# Patient Record
Sex: MALE | Race: WHITE | NOT HISPANIC OR LATINO | Employment: OTHER | URBAN - NONMETROPOLITAN AREA
[De-identification: names, ages, dates, MRNs, and addresses within clinical notes are randomized per-mention and may not be internally consistent; named-entity substitution may affect disease eponyms.]

---

## 2017-01-06 ENCOUNTER — OFFICE VISIT (OUTPATIENT)
Dept: ORTHOPEDIC SURGERY | Facility: CLINIC | Age: 67
End: 2017-01-06

## 2017-01-06 VITALS — HEIGHT: 68 IN | BODY MASS INDEX: 25.46 KG/M2 | WEIGHT: 168 LBS

## 2017-01-06 DIAGNOSIS — M54.5 LOW BACK PAIN, UNSPECIFIED BACK PAIN LATERALITY, UNSPECIFIED CHRONICITY, WITH SCIATICA PRESENCE UNSPECIFIED: ICD-10-CM

## 2017-01-06 DIAGNOSIS — C79.51 METASTASIS TO SPINAL COLUMN (HCC): Primary | ICD-10-CM

## 2017-01-06 DIAGNOSIS — C61 PROSTATE CANCER METASTATIC TO MULTIPLE SITES (HCC): ICD-10-CM

## 2017-01-06 PROCEDURE — 96372 THER/PROPH/DIAG INJ SC/IM: CPT | Performed by: ORTHOPAEDIC SURGERY

## 2017-01-06 PROCEDURE — 99213 OFFICE O/P EST LOW 20 MIN: CPT | Performed by: ORTHOPAEDIC SURGERY

## 2017-01-06 RX ORDER — BENAZEPRIL/HYDROCHLOROTHIAZIDE 20 MG-25MG
TABLET ORAL
COMMUNITY
Start: 2017-01-03 | End: 2018-01-02 | Stop reason: CLARIF

## 2017-01-06 RX ORDER — IBUPROFEN 800 MG/1
TABLET ORAL
COMMUNITY
Start: 2016-11-22 | End: 2018-01-02 | Stop reason: ALTCHOICE

## 2017-01-06 RX ORDER — MEPERIDINE HYDROCHLORIDE 50 MG/1
TABLET ORAL
Refills: 0 | COMMUNITY
Start: 2016-11-07 | End: 2018-01-02 | Stop reason: CLARIF

## 2017-01-06 RX ORDER — KETOROLAC TROMETHAMINE 30 MG/ML
60 INJECTION, SOLUTION INTRAMUSCULAR; INTRAVENOUS ONCE
Status: COMPLETED | OUTPATIENT
Start: 2017-01-06 | End: 2017-01-06

## 2017-01-06 RX ORDER — HYDROCODONE BITARTRATE AND ACETAMINOPHEN 10; 325 MG/1; MG/1
TABLET ORAL
COMMUNITY
Start: 2016-12-21 | End: 2019-01-01 | Stop reason: SDUPTHER

## 2017-01-06 RX ADMIN — KETOROLAC TROMETHAMINE 60 MG: 30 INJECTION, SOLUTION INTRAMUSCULAR; INTRAVENOUS at 16:48

## 2017-01-06 NOTE — MR AVS SNAPSHOT
Houston Urias   1/6/2017 3:40 PM   Office Visit    Dept Phone:  [phone]   Encounter #:  [id number]    Provider:  Nick Mooney MD   Department:  [facility] ORTHOPEDICS                Your Full Care Plan              Today's Medication Changes          These changes are accurate as of: 1/6/17  4:47 PM.  If you have any questions, ask your nurse or doctor.               Medication(s)that have changed:     benazepril-hydrochlorthiazide 20-25 MG per tablet   Commonly known as:  LOTENSIN HCT   What changed:  Another medication with the same name was removed. Continue taking this medication, and follow the directions you see here.       HYDROcodone-acetaminophen  MG per tablet   Commonly known as:  NORCO   What changed:  Another medication with the same name was removed. Continue taking this medication, and follow the directions you see here.                  Your Updated Medication List          This list is accurate as of: 1/6/17  4:47 PM.  Always use your most recent med list.                benazepril-hydrochlorthiazide 20-25 MG per tablet   Commonly known as:  LOTENSIN HCT       finasteride 5 MG tablet   Commonly known as:  PROSCAR       HYDROcodone-acetaminophen  MG per tablet   Commonly known as:  NORCO       ibuprofen 800 MG tablet   Commonly known as:  ADVIL,MOTRIN       meperidine 50 MG tablet   Commonly known as:  DEMEROL       metFORMIN 1000 MG tablet   Commonly known as:  GLUCOPHAGE       MethylPREDNISolone 4 MG tablet   Commonly known as:  MEDROL (LIO)       omeprazole 20 MG capsule   Commonly known as:  priLOSEC       predniSONE 20 MG tablet   Commonly known as:  DELTASONE               You Were Diagnosed With        Codes Comments    Metastasis to spinal column    -  Primary ICD-10-CM: C79.51  ICD-9-CM: 198.5     Prostate cancer metastatic to multiple sites     ICD-10-CM: C61  ICD-9-CM: 185, 199.0     Low back pain, unspecified  "back pain laterality, unspecified chronicity, with sciatica presence unspecified     ICD-10-CM: M54.5  ICD-9-CM: 724.2       Instructions     None    Patient Instructions History      Upcoming Appointments     Visit Type Date Time Department    FOLLOW UP 1/6/2017  3:40 PM [facility]      MyChart Signup     Our records indicate that you have declined [employer] signup. If you would like to sign up for [employer], please email Dr Sears [email or web address] or call [phone] to obtain an activation code.             Other Info from Your Visit           Allergies     No Known Allergies      Reason for Visit     Thoracic Spine - Follow-up     Lumbar Spine - Follow-up     Results MRI 12/16/16 @  thoracic and lumbar.       Vital Signs     Height Weight Body Mass Index Smoking Status          68\" (172.7 cm) 168 lb (76.2 kg) 25.54 kg/m2 Never Smoker        Problems and Diagnoses Noted     Benign enlargement of prostate    High blood pressure    Gout    History of malignant neoplasm of prostate    Low back pain    Metastasis to spinal column    Multiple-type hyperlipidemia    Degenerative arthritis of hand    Prostate cancer metastatic to multiple sites    Type 2 diabetes        "

## 2017-01-06 NOTE — LETTER
"January 16, 2017     Nilson Houston MD  [address]  [facility] [address]    Patient: Houston Urias   YOB: 1950   Date of Visit: 1/6/2017       Dear Dr. Rosemarie MD:    Houston Urias was in my office today. Below is a copy of my note.    If you have questions, please do not hesitate to call me. I look forward to following Houston along with you.         Sincerely,        Nick Mooney MD        CC: No Recipients    Houston Urias is a 66 y.o. male returns for     Chief Complaint   Patient presents with   • Thoracic Spine - Follow-up   • Lumbar Spine - Follow-up   • Results     MRI 12/16/16 @  thoracic and lumbar.        HISTORY OF PRESENT ILLNESS:    Comes today to review the MRI of the thoracic spine and lumbar spines.  He has back pain, without any significant changes.       CONCURRENT MEDICAL HISTORY:    No past medical history on file.    No Known Allergies      Current Outpatient Prescriptions:   •  benazepril-hydrochlorthiazide (LOTENSIN HCT) 20-25 MG per tablet, , Disp: , Rfl:   •  finasteride (PROSCAR) 5 MG tablet, , Disp: , Rfl:   •  HYDROcodone-acetaminophen (NORCO)  MG per tablet, , Disp: , Rfl:   •  ibuprofen (ADVIL,MOTRIN) 800 MG tablet, , Disp: , Rfl:   •  meperidine (DEMEROL) 50 MG tablet, TK 1 T PO Q 8 H PRN, Disp: , Rfl: 0  •  metFORMIN (GLUCOPHAGE) 1000 MG tablet, , Disp: , Rfl:   •  MethylPREDNISolone (MEDROL, LIO,) 4 MG tablet, TK UTD, Disp: , Rfl: 1  •  omeprazole (priLOSEC) 20 MG capsule, Take 20 mg by mouth Daily., Disp: , Rfl:   •  predniSONE (DELTASONE) 20 MG tablet, , Disp: , Rfl: 0    No past surgical history on file.    ROS  No fevers or chills.  No chest pain or shortness of air.  No GI or  disturbances.    PHYSICAL EXAMINATION:       Visit Vitals   • Ht 68\" (172.7 cm)   • Wt 168 lb (76.2 kg)   • BMI 25.54 kg/m2       Physical Exam   Constitutional: He appears well-developed.   HENT:   Head: Normocephalic and atraumatic. "   Eyes: Pupils are equal, round, and reactive to light. Right eye exhibits no discharge. Left eye exhibits no discharge.   Neck: Normal range of motion. No JVD present. No tracheal deviation present. No thyromegaly present.   Cardiovascular: Normal rate, regular rhythm, normal heart sounds and intact distal pulses.  Exam reveals no gallop and no friction rub.    No murmur heard.  Pulmonary/Chest: Effort normal and breath sounds normal. No respiratory distress. He has no wheezes. He has no rales. He exhibits no tenderness.   Abdominal: Soft. Bowel sounds are normal. He exhibits no distension. There is no tenderness. There is no guarding.   Musculoskeletal: He exhibits no edema or deformity.        Lumbar back: He exhibits decreased range of motion and tenderness. He exhibits no bony tenderness, no swelling, no edema, no deformity, no laceration, no pain and no spasm.   Lymphadenopathy:     He has no cervical adenopathy.   Neurological: He is alert. He has normal reflexes. He displays normal reflexes. No cranial nerve deficit. Coordination normal.   Skin: Skin is warm. No rash noted. No erythema.   Psychiatric: He has a normal mood and affect. His behavior is normal. Thought content normal.       GAIT:     []  Normal  []  Antalgic    Assistive device: []  None  []  Walker     []  Crutches  []  Cane     []  Wheelchair  []  Stretcher    Right Ankle Exam     Muscle Strength   Dorsiflexion:  3/5  Plantar flexion:  5/5  Anterior tibial:  3/5  Posterior tibial:  5/5  Gastrocsoleus:  5/5  Peroneal muscle:  5/5      Left Ankle Exam     Muscle Strength   Dorsiflexion:  5/5   Plantar flexion:  5/5   Anterior tibial:  5/5   Posterior tibial:  5/5  Gastrocsoleus:  5/5  Peroneal muscle:  5/5      Right Hip Exam     Range of Motion   Internal Rotation: normal   External Rotation: normal     Muscle Strength   Abduction: 5/5   Flexion: 4/5       Left Hip Exam     Range of Motion   Internal Rotation: normal   External Rotation: normal      Muscle Strength   Abduction: 5/5   Flexion: 4/5       Back Exam     Tenderness   The patient is experiencing no tenderness.     Range of Motion   Extension: 30   Flexion:  90 normal   Lateral Bend Right: 20   Lateral Bend Left: 20   Rotation Right: 30   Rotation Left: 30     Muscle Strength   Right Quadriceps:  5/5   Left Quadriceps:  5/5   Right Hamstrings:  5/5   Left Hamstrings:  5/5     Tests   Straight leg raise right: negative  Straight leg raise left: negative    Reflexes   Patellar:  0/4 abnormal  Achilles: 2/4  Biceps: 2/4  Babinski's sign: normal     Other   Sensation: normal  Gait: normal   Erythema: no back redness  Scars: absent              MRI thoracic spine with and without [id number]/16/2016  [facility]  Result Impression       There is bony metastasis to C7 and most of the thoracic vertebrae with early epidural extension at multiple levels without significant spinal stenosis.   Result Narrative   Indication:  Bone metastasis from prostate.    MRI, T-spine with/without 7.5 mL Gadovist:  There is metastasis to the left posterior-inferior C7 vertebral body.  T1 and T2 are unremarkable.  The left posterior T3 vertebral body and left pedicle have tumor involvement without epidural extension.    There is extensive tumor involvement of the T4 vertebral body, right pedicle, left pedicle, and adjacent lamina.  There is early right ventral epidural extension but this is not causing Significant encroachment on the canal.    The left side of the T5 vertebral body has extensive tumor involvement with early right ventral epidural extension.  This also involves the right pedicle and adjacent lamina; no significant encroachment on the spinal canal but there is epidural   involvement of the right neural foramen with uncertain impact on the right T5 nerve root.    There are several small areas of tumor involvement at T6 and there is tumor in the right pedicle.    T7 has extensive tumor involvement in the  vertebral body and in both pedicles with early ventral epidural tumor.    There is extensive tumor involvement of T8 with epidural extension that is mild and not significantly compressing the canal.    T9 has several small areas of involvement in the body and both pedicles are involved with tumor.  There is early epidural extension without significant spinal stenosis.  There is mild involvement of the left pedicle of T10.    T11 has mild involvement of the left pedicle and a larger lesion in the vertebral body.    T12 is unremarkable.    There is patchy enhancement of the lesions with gadolinium.     MRI lumbar spine with and without [id number]/16/2016  [facility]  Result Impression       1.  There is metastasis to all of the lumbar vertebrae and the sacrum.    2.  This is most extensive at the L3 level where there is epidural extension on the right with flattening of the right side of the thecal sac and involvement of the right L3 nerve root.  There may be early retroperitoneal invasion.   Result Narrative   Indication:  Prostate cancer.    MRI, L-spine with/without 7.5 mL Gadovist:  The L1 vertebral body has several small areas of tumor involvement.  There are larger areas of tumor involvement in the L2 vertebral body with extension into the left pedicle.    At the L3 level, the entire vertebra is infiltrated with tumor and there is epidural extension with early infiltration of the right L3 nerve root and flattening of the right side of the thecal sac.    The L4 level has extensive tumor deposits in the vertebral body without epidural extension.    The left anterior L5 vertebral body has tumor involvement.    The S1 vertebral body has extensive tumor.    No significant spinal stenosis is seen.  The conus has a normal position and appearance.  No retroperitoneal adenopathy is seen.  The posteromedial aspect of the psoas muscles at L3  have a subtly infiltrated appearance to the adjacent fat and early    retroperitoneal invasion may be present.               ASSESSMENT:    Diagnoses and all orders for this visit:    Metastasis to spinal column    Prostate cancer metastatic to multiple sites    Low back pain, unspecified back pain laterality, unspecified chronicity, with sciatica presence unspecified  -     ketorolac (TORADOL) injection 60 mg; Inject 60 mg into the shoulder, thigh, or buttocks 1 (One) Time.    Other orders  -     HYDROcodone-acetaminophen (NORCO)  MG per tablet;   -     ibuprofen (ADVIL,MOTRIN) 800 MG tablet;   -     meperidine (DEMEROL) 50 MG tablet; TK 1 T PO Q 8 H PRN  -     metFORMIN (GLUCOPHAGE) 1000 MG tablet;   -     benazepril-hydrochlorthiazide (LOTENSIN HCT) 20-25 MG per tablet;           PLAN    No surgery without focal neurologic deficit or vertebral body destruction.  Recommend possible radiation therapy, if he is an appropriate candidate.  I do not believe he is seeing a radiation oncologist, although I reviewed with him this an option.        Nick Mooney MD

## 2017-01-06 NOTE — PROGRESS NOTES
"Houston Urias is a 66 y.o. male returns for     Chief Complaint   Patient presents with   • Thoracic Spine - Follow-up   • Lumbar Spine - Follow-up   • Results     MRI 12/16/16 @  thoracic and lumbar.        HISTORY OF PRESENT ILLNESS:    Comes today to review the MRI of the thoracic spine and lumbar spines.  He has back pain, without any significant changes.       CONCURRENT MEDICAL HISTORY:    No past medical history on file.    No Known Allergies      Current Outpatient Prescriptions:   •  benazepril-hydrochlorthiazide (LOTENSIN HCT) 20-25 MG per tablet, , Disp: , Rfl:   •  finasteride (PROSCAR) 5 MG tablet, , Disp: , Rfl:   •  HYDROcodone-acetaminophen (NORCO)  MG per tablet, , Disp: , Rfl:   •  ibuprofen (ADVIL,MOTRIN) 800 MG tablet, , Disp: , Rfl:   •  meperidine (DEMEROL) 50 MG tablet, TK 1 T PO Q 8 H PRN, Disp: , Rfl: 0  •  metFORMIN (GLUCOPHAGE) 1000 MG tablet, , Disp: , Rfl:   •  MethylPREDNISolone (MEDROL, LIO,) 4 MG tablet, TK UTD, Disp: , Rfl: 1  •  omeprazole (priLOSEC) 20 MG capsule, Take 20 mg by mouth Daily., Disp: , Rfl:   •  predniSONE (DELTASONE) 20 MG tablet, , Disp: , Rfl: 0    No past surgical history on file.    ROS  No fevers or chills.  No chest pain or shortness of air.  No GI or  disturbances.    PHYSICAL EXAMINATION:       Visit Vitals   • Ht 68\" (172.7 cm)   • Wt 168 lb (76.2 kg)   • BMI 25.54 kg/m2       Physical Exam   Constitutional: He appears well-developed.   HENT:   Head: Normocephalic and atraumatic.   Eyes: Pupils are equal, round, and reactive to light. Right eye exhibits no discharge. Left eye exhibits no discharge.   Neck: Normal range of motion. No JVD present. No tracheal deviation present. No thyromegaly present.   Cardiovascular: Normal rate, regular rhythm, normal heart sounds and intact distal pulses.  Exam reveals no gallop and no friction rub.    No murmur heard.  Pulmonary/Chest: Effort normal and breath sounds normal. No respiratory distress. He " has no wheezes. He has no rales. He exhibits no tenderness.   Abdominal: Soft. Bowel sounds are normal. He exhibits no distension. There is no tenderness. There is no guarding.   Musculoskeletal: He exhibits no edema or deformity.        Lumbar back: He exhibits decreased range of motion and tenderness. He exhibits no bony tenderness, no swelling, no edema, no deformity, no laceration, no pain and no spasm.   Lymphadenopathy:     He has no cervical adenopathy.   Neurological: He is alert. He has normal reflexes. He displays normal reflexes. No cranial nerve deficit. Coordination normal.   Skin: Skin is warm. No rash noted. No erythema.   Psychiatric: He has a normal mood and affect. His behavior is normal. Thought content normal.       GAIT:     []  Normal  []  Antalgic    Assistive device: []  None  []  Walker     []  Crutches  []  Cane     []  Wheelchair  []  Stretcher    Right Ankle Exam     Muscle Strength   Dorsiflexion:  3/5  Plantar flexion:  5/5  Anterior tibial:  3/5  Posterior tibial:  5/5  Gastrocsoleus:  5/5  Peroneal muscle:  5/5      Left Ankle Exam     Muscle Strength   Dorsiflexion:  5/5   Plantar flexion:  5/5   Anterior tibial:  5/5   Posterior tibial:  5/5  Gastrocsoleus:  5/5  Peroneal muscle:  5/5      Right Hip Exam     Range of Motion   Internal Rotation: normal   External Rotation: normal     Muscle Strength   Abduction: 5/5   Flexion: 4/5       Left Hip Exam     Range of Motion   Internal Rotation: normal   External Rotation: normal     Muscle Strength   Abduction: 5/5   Flexion: 4/5       Back Exam     Tenderness   The patient is experiencing no tenderness.     Range of Motion   Extension: 30   Flexion:  90 normal   Lateral Bend Right: 20   Lateral Bend Left: 20   Rotation Right: 30   Rotation Left: 30     Muscle Strength   Right Quadriceps:  5/5   Left Quadriceps:  5/5   Right Hamstrings:  5/5   Left Hamstrings:  5/5     Tests   Straight leg raise right: negative  Straight leg raise left:  negative    Reflexes   Patellar:  0/4 abnormal  Achilles: 2/4  Biceps: 2/4  Babinski's sign: normal     Other   Sensation: normal  Gait: normal   Erythema: no back redness  Scars: absent              MRI thoracic spine with and without [id number]/16/2016  [facility]  Result Impression       There is bony metastasis to C7 and most of the thoracic vertebrae with early epidural extension at multiple levels without significant spinal stenosis.   Result Narrative   Indication:  Bone metastasis from prostate.    MRI, T-spine with/without 7.5 mL Gadovist:  There is metastasis to the left posterior-inferior C7 vertebral body.  T1 and T2 are unremarkable.  The left posterior T3 vertebral body and left pedicle have tumor involvement without epidural extension.    There is extensive tumor involvement of the T4 vertebral body, right pedicle, left pedicle, and adjacent lamina.  There is early right ventral epidural extension but this is not causing Significant encroachment on the canal.    The left side of the T5 vertebral body has extensive tumor involvement with early right ventral epidural extension.  This also involves the right pedicle and adjacent lamina; no significant encroachment on the spinal canal but there is epidural   involvement of the right neural foramen with uncertain impact on the right T5 nerve root.    There are several small areas of tumor involvement at T6 and there is tumor in the right pedicle.    T7 has extensive tumor involvement in the vertebral body and in both pedicles with early ventral epidural tumor.    There is extensive tumor involvement of T8 with epidural extension that is mild and not significantly compressing the canal.    T9 has several small areas of involvement in the body and both pedicles are involved with tumor.  There is early epidural extension without significant spinal stenosis.  There is mild involvement of the left pedicle of T10.    T11 has mild involvement of the left  pedicle and a larger lesion in the vertebral body.    T12 is unremarkable.    There is patchy enhancement of the lesions with gadolinium.     MRI lumbar spine with and without [id number]/16/2016  [facility]  Result Impression       1.  There is metastasis to all of the lumbar vertebrae and the sacrum.    2.  This is most extensive at the L3 level where there is epidural extension on the right with flattening of the right side of the thecal sac and involvement of the right L3 nerve root.  There may be early retroperitoneal invasion.   Result Narrative   Indication:  Prostate cancer.    MRI, L-spine with/without 7.5 mL Gadovist:  The L1 vertebral body has several small areas of tumor involvement.  There are larger areas of tumor involvement in the L2 vertebral body with extension into the left pedicle.    At the L3 level, the entire vertebra is infiltrated with tumor and there is epidural extension with early infiltration of the right L3 nerve root and flattening of the right side of the thecal sac.    The L4 level has extensive tumor deposits in the vertebral body without epidural extension.    The left anterior L5 vertebral body has tumor involvement.    The S1 vertebral body has extensive tumor.    No significant spinal stenosis is seen.  The conus has a normal position and appearance.  No retroperitoneal adenopathy is seen.  The posteromedial aspect of the psoas muscles at L3  have a subtly infiltrated appearance to the adjacent fat and early   retroperitoneal invasion may be present.               ASSESSMENT:    Diagnoses and all orders for this visit:    Metastasis to spinal column    Prostate cancer metastatic to multiple sites    Low back pain, unspecified back pain laterality, unspecified chronicity, with sciatica presence unspecified  -     ketorolac (TORADOL) injection 60 mg; Inject 60 mg into the shoulder, thigh, or buttocks 1 (One) Time.    Other orders  -     HYDROcodone-acetaminophen (NORCO)   MG per tablet;   -     ibuprofen (ADVIL,MOTRIN) 800 MG tablet;   -     meperidine (DEMEROL) 50 MG tablet; TK 1 T PO Q 8 H PRN  -     metFORMIN (GLUCOPHAGE) 1000 MG tablet;   -     benazepril-hydrochlorthiazide (LOTENSIN HCT) 20-25 MG per tablet;           PLAN    No surgery without focal neurologic deficit or vertebral body destruction.  Recommend possible radiation therapy, if he is an appropriate candidate.  I do not believe he is seeing a radiation oncologist, although I reviewed with him this an option.        Nick Mooney MD

## 2017-10-24 ENCOUNTER — HOSPITAL ENCOUNTER (OUTPATIENT)
Dept: MRI IMAGING | Facility: HOSPITAL | Age: 67
Discharge: HOME | End: 2017-10-24
Admitting: INTERNAL MEDICINE

## 2017-10-24 ENCOUNTER — HOSPITAL ENCOUNTER (OUTPATIENT)
Dept: MRI IMAGING | Facility: HOSPITAL | Age: 67
Discharge: HOME | End: 2017-10-24

## 2017-10-24 DIAGNOSIS — C61 MALIGNANT NEOPLASM OF PROSTATE (HCC): ICD-10-CM

## 2017-10-24 PROCEDURE — 72158 MRI LUMBAR SPINE W/O & W/DYE: CPT

## 2017-10-24 PROCEDURE — 25010000002 GADOTERIDOL PER 1 ML: Performed by: INTERNAL MEDICINE

## 2017-10-24 PROCEDURE — 72156 MRI NECK SPINE W/O & W/DYE: CPT

## 2017-10-24 PROCEDURE — A9576 INJ PROHANCE MULTIPACK: HCPCS | Performed by: INTERNAL MEDICINE

## 2017-10-24 PROCEDURE — 72157 MRI CHEST SPINE W/O & W/DYE: CPT

## 2017-10-24 RX ADMIN — GADOTERIDOL 18 ML: 279.3 INJECTION, SOLUTION INTRAVENOUS at 14:58

## 2017-10-25 DIAGNOSIS — C61 MALIGNANT NEOPLASM OF PROSTATE (HCC): Primary | ICD-10-CM

## 2017-11-01 ENCOUNTER — HOSPITAL ENCOUNTER (OUTPATIENT)
Dept: NUCLEAR MEDICINE | Facility: HOSPITAL | Age: 67
Discharge: HOME | End: 2017-11-01

## 2017-11-01 DIAGNOSIS — C61 MALIGNANT NEOPLASM OF PROSTATE (HCC): ICD-10-CM

## 2017-11-01 PROCEDURE — 0 TECHNETIUM MEDRONATE KIT: Performed by: UROLOGY

## 2017-11-01 PROCEDURE — A9503 TC99M MEDRONATE: HCPCS | Performed by: UROLOGY

## 2017-11-01 PROCEDURE — 78306 BONE IMAGING WHOLE BODY: CPT

## 2017-11-01 RX ORDER — TC 99M MEDRONATE 20 MG/10ML
26.8 INJECTION, POWDER, LYOPHILIZED, FOR SOLUTION INTRAVENOUS
Status: COMPLETED | OUTPATIENT
Start: 2017-11-01 | End: 2017-11-01

## 2017-11-01 RX ADMIN — Medication 26.8 MILLICURIE: at 09:19

## 2018-01-02 RX ORDER — TAMSULOSIN HYDROCHLORIDE 0.4 MG/1
1 CAPSULE ORAL NIGHTLY
COMMUNITY

## 2018-01-02 RX ORDER — MORPHINE SULFATE 30 MG/1
30 TABLET ORAL
COMMUNITY

## 2018-01-09 ENCOUNTER — ANESTHESIA EVENT (OUTPATIENT)
Dept: GASTROENTEROLOGY | Facility: HOSPITAL | Age: 68
End: 2018-01-09

## 2018-01-09 ENCOUNTER — ANESTHESIA (OUTPATIENT)
Dept: GASTROENTEROLOGY | Facility: HOSPITAL | Age: 68
End: 2018-01-09

## 2018-01-09 ENCOUNTER — HOSPITAL ENCOUNTER (OUTPATIENT)
Facility: HOSPITAL | Age: 68
Setting detail: HOSPITAL OUTPATIENT SURGERY
Discharge: HOME | End: 2018-01-09
Attending: INTERNAL MEDICINE | Admitting: INTERNAL MEDICINE

## 2018-01-09 VITALS
SYSTOLIC BLOOD PRESSURE: 108 MMHG | HEIGHT: 68 IN | WEIGHT: 151.9 LBS | DIASTOLIC BLOOD PRESSURE: 46 MMHG | BODY MASS INDEX: 23.02 KG/M2 | RESPIRATION RATE: 14 BRPM | OXYGEN SATURATION: 96 % | TEMPERATURE: 97.7 F | HEART RATE: 93 BPM

## 2018-01-09 DIAGNOSIS — Z12.11 ENCOUNTER FOR SCREENING COLONOSCOPY: ICD-10-CM

## 2018-01-09 LAB — GLUCOSE BLDC GLUCOMTR-MCNC: 129 MG/DL (ref 70–130)

## 2018-01-09 PROCEDURE — 88305 TISSUE EXAM BY PATHOLOGIST: CPT | Performed by: PATHOLOGY

## 2018-01-09 PROCEDURE — 82962 GLUCOSE BLOOD TEST: CPT

## 2018-01-09 PROCEDURE — 25010000002 FENTANYL CITRATE (PF) 100 MCG/2ML SOLUTION: Performed by: NURSE ANESTHETIST, CERTIFIED REGISTERED

## 2018-01-09 PROCEDURE — 88305 TISSUE EXAM BY PATHOLOGIST: CPT | Performed by: INTERNAL MEDICINE

## 2018-01-09 PROCEDURE — 25010000002 PROPOFOL 10 MG/ML EMULSION: Performed by: NURSE ANESTHETIST, CERTIFIED REGISTERED

## 2018-01-09 RX ORDER — FENTANYL CITRATE 50 UG/ML
INJECTION, SOLUTION INTRAMUSCULAR; INTRAVENOUS AS NEEDED
Status: DISCONTINUED | OUTPATIENT
Start: 2018-01-09 | End: 2018-01-09 | Stop reason: SURG

## 2018-01-09 RX ORDER — LIDOCAINE HYDROCHLORIDE 10 MG/ML
INJECTION, SOLUTION INFILTRATION; PERINEURAL AS NEEDED
Status: DISCONTINUED | OUTPATIENT
Start: 2018-01-09 | End: 2018-01-09 | Stop reason: SURG

## 2018-01-09 RX ORDER — DEXTROSE AND SODIUM CHLORIDE 5; .45 G/100ML; G/100ML
20 INJECTION, SOLUTION INTRAVENOUS CONTINUOUS
Status: DISCONTINUED | OUTPATIENT
Start: 2018-01-09 | End: 2018-01-09 | Stop reason: HOSPADM

## 2018-01-09 RX ORDER — PROPOFOL 10 MG/ML
VIAL (ML) INTRAVENOUS AS NEEDED
Status: DISCONTINUED | OUTPATIENT
Start: 2018-01-09 | End: 2018-01-09 | Stop reason: SURG

## 2018-01-09 RX ADMIN — DEXTROSE AND SODIUM CHLORIDE 20 ML/HR: 5; 450 INJECTION, SOLUTION INTRAVENOUS at 09:01

## 2018-01-09 RX ADMIN — PROPOFOL 30 MG: 10 INJECTION, EMULSION INTRAVENOUS at 09:45

## 2018-01-09 RX ADMIN — PROPOFOL 20 MG: 10 INJECTION, EMULSION INTRAVENOUS at 09:52

## 2018-01-09 RX ADMIN — LIDOCAINE HYDROCHLORIDE 80 MG: 10 INJECTION, SOLUTION INFILTRATION; PERINEURAL at 09:41

## 2018-01-09 RX ADMIN — PROPOFOL 80 MG: 10 INJECTION, EMULSION INTRAVENOUS at 09:41

## 2018-01-09 RX ADMIN — PROPOFOL 30 MG: 10 INJECTION, EMULSION INTRAVENOUS at 09:49

## 2018-01-09 RX ADMIN — FENTANYL CITRATE 50 MCG: 50 INJECTION, SOLUTION INTRAMUSCULAR; INTRAVENOUS at 09:41

## 2018-01-09 NOTE — ANESTHESIA POSTPROCEDURE EVALUATION
Patient: Houston Urias    Procedure Summary     Date Anesthesia Start Anesthesia Stop Room / Location    01/09/18 0941 [id number] [facility] ENDOSCOPY 2 / [facility] ENDOSCOPY       Procedure Diagnosis Surgeon Provider    COLONOSCOPY (N/A ) Encounter for screening colonoscopy  (Encounter for screening colonoscopy [Z12.11]) DO Earnest Membreno CRNA          Anesthesia Type: MAC  Last vitals  BP   114/57 (01/09/18 0844)   Temp   97.7 °F (36.5 °C) (01/09/18 0844)   Pulse   108 (01/09/18 0844)   Resp   16 (01/09/18 0844)     SpO2   98 % (01/09/18 0844)     Post Anesthesia Care and Evaluation    Patient location during evaluation: bedside  Patient participation: waiting for patient participation  Level of consciousness: responsive to verbal stimuli  Pain management: adequate  Airway patency: patent  Anesthetic complications: No anesthetic complications  PONV Status: none  Cardiovascular status: acceptable  Respiratory status: acceptable  Hydration status: acceptable

## 2018-01-09 NOTE — PLAN OF CARE
Problem: Patient Care Overview (Adult)  Goal: Plan of Care Review   01/09/18 1011   Coping/Psychosocial Response Interventions   Plan Of Care Reviewed With patient   Patient Care Overview   Progress no change   Outcome Evaluation   Outcome Summary/Follow up Plan vss       Problem: GI Endoscopy (Adult)  Goal: Signs and Symptoms of Listed Potential Problems Will be Absent or Manageable (GI Endoscopy)   01/09/18 1011   GI Endoscopy   Problems Assessed (GI Endoscopy) all   Problems Present (GI Endoscopy) none

## 2018-01-09 NOTE — PLAN OF CARE
Problem: Patient Care Overview (Adult)  Goal: Plan of Care Review   01/09/18 0958   Coping/Psychosocial Response Interventions   Plan Of Care Reviewed With patient   Patient Care Overview   Progress no change   Outcome Evaluation   Outcome Summary/Follow up Plan vss       Problem: GI Endoscopy (Adult)  Goal: Signs and Symptoms of Listed Potential Problems Will be Absent or Manageable (GI Endoscopy)   01/09/18 0958   GI Endoscopy   Problems Assessed (GI Endoscopy) all   Problems Present (GI Endoscopy) none

## 2018-01-09 NOTE — H&P
Giovany Villanueva DO,[facility]  Gastroenterology  Hepatology  Endoscopy  Board Certified in Internal Medicine and gastroenterology  [address], [address]  [place]. [address]  - [phone]   F [phone]     GASTROENTEROLOGY HISTORY AND PHYSICAL  NOTE   GIOVANY VILLANUEVA DO.         SUBJECTIVE:   1/9/2018    Name: Houston Urias  DOD: 1950        Chief Complaint:     Subjective : Screening examination for routine risk for colon cancer     Patient is 67 y.o. male presents with desire for colonoscopy.  History of radiation to the back and prostate for prostate cancer.      ROS/HISTORY/ CURRENT MEDICATIONS/OBJECTIVE/VS/PE:   Review of Systems:   Review of Systems    History:     Past Medical History:   Diagnosis Date   • Diabetes mellitus    • Hypertension    • Prostate CA 2011   • Prostate cancer metastatic to bone 2016     Past Surgical History:   Procedure Laterality Date   • LEG SURGERY Right 1973   • PROSTATE CRYOTHERAPY  2011     History reviewed. No pertinent family history.  Social History   Substance Use Topics   • Smoking status: Never Smoker   • Smokeless tobacco: Never Used   • Alcohol use No     Prescriptions Prior to Admission   Medication Sig Dispense Refill Last Dose   • benazepril 20 MG tablet 20 mg, hydrochlorothiazide 25 MG tablet 12.5 mg Take 1 tablet by mouth Daily.   1/7/2018 at Unknown time   • HYDROcodone-acetaminophen (NORCO)  MG per tablet    1/8/2018 at Unknown time   • Morphine (MSIR) 30 MG tablet Take 30 mg by mouth 2 (Two) Times a Day.   1/9/2018 at Unknown time   • omeprazole (priLOSEC) 20 MG capsule Take 20 mg by mouth Daily.   1/7/2018   • SAXagliptin (ONGLYZA) 2.5 MG tablet Take 2.5 mg by mouth Daily.   1/7/2018   • tamsulosin (FLOMAX) 0.4 MG capsule 24 hr capsule Take 1 capsule by mouth Every Night.   1/7/2018     Allergies:  Review of patient's allergies indicates no known allergies.    I have reviewed the patients medical history, surgical history  and family history in the available medical record system.     Current Medications:     Current Facility-Administered Medications   Medication Dose Route Frequency Provider Last Rate Last Dose   • dextrose 5 % and sodium chloride 0.45 % infusion  20 mL/hr Intravenous Continuous Francisco Angela DO 20 mL/hr at 01/09/18 0901 20 mL/hr at 01/09/18 0901       Objective     Physical Exam:   Temp:  [97.7 °F (36.5 °C)] 97.7 °F (36.5 °C)  Heart Rate:  [108] 108  Resp:  [16] 16  BP: (114)/(57) 114/57    Physical Exam:  General Appearance:    Alert, cooperative, in no acute distress   Head:    Normocephalic, without obvious abnormality, atraumatic   Eyes:            Lids and lashes normal, conjunctivae and sclerae normal, no   icterus, no pallor, corneas clear, PERRLA   Ears:    Ears appear intact with no abnormalities noted   Throat:   No oral lesions, no thrush, oral mucosa moist   Neck:   No adenopathy, supple, trachea midline, no thyromegaly, no     carotid bruit, no JVD   Back:     No kyphosis present, no scoliosis present, no skin lesions,       erythema or scars, no tenderness to percussion or                   palpation,   range of motion normal   Lungs:     Clear to auscultation,respirations regular, even and                   unlabored    Heart:    Regular rhythm and normal rate, normal S1 and S2, no            murmur, no gallop, no rub, no click   Breast Exam:    Deferred   Abdomen:     Normal bowel sounds, no masses, no organomegaly, soft        non-tender, non-distended, no guarding, no rebound                 tenderness   Genitalia:    Deferred   Extremities:   Moves all extremities well, no edema, no cyanosis, no              redness   Pulses:   Pulses palpable and equal bilaterally   Skin:   No bleeding, bruising or rash   Lymph nodes:   No palpable adenopathy   Neurologic:   Cranial nerves 2 - 12 grossly intact, sensation intact, DTR        present and equal bilaterally      Results Review:     No results  found for: WBC, HGB, HCT, PLT          No results found for: LIPASE  No results found for: INR       Radiology Review:  Imaging Results (last 72 hours)     ** No results found for the last 72 hours. **           I reviewed the patient's new clinical results.  I reviewed the patient's new imaging results and agree with the interpretation.     ASSESSMENT/PLAN:   ASSESSMENT:   1.  Screening examination for routine risk for colon cancer    PLAN:   1.  Colonoscopy    Risk and is benefits associated with the procedure are reviewed with the patient.  The patient wishes to proceed      Francisco Angela DO  01/09/18  9:30 AM

## 2018-01-09 NOTE — ANESTHESIA PREPROCEDURE EVALUATION
Anesthesia Evaluation     NPO Solid Status: > 8 hours  NPO Liquid Status: > 8 hours     Airway   Mallampati: III  small opening  Dental - normal exam     Pulmonary - normal exam   Cardiovascular - normal exam    (+) hypertension, hyperlipidemia      Neuro/Psych  GI/Hepatic/Renal/Endo    (+)  diabetes mellitus,     Musculoskeletal     (+) back pain,   Abdominal    Substance History      OB/GYN          Other   (+) arthritis   history of cancer                                            Anesthesia Plan    ASA 3     MAC     intravenous induction   Anesthetic plan and risks discussed with patient.

## 2018-01-10 LAB
LAB AP CASE REPORT: NORMAL
Lab: NORMAL
PATH REPORT.FINAL DX SPEC: NORMAL
PATH REPORT.GROSS SPEC: NORMAL

## 2018-01-25 ENCOUNTER — HOSPITAL ENCOUNTER (OUTPATIENT)
Dept: NUCLEAR MEDICINE | Facility: HOSPITAL | Age: 68
Discharge: HOME | End: 2018-01-25

## 2018-01-25 DIAGNOSIS — C61 MALIGNANT NEOPLASM OF PROSTATE (HCC): ICD-10-CM

## 2018-01-25 PROCEDURE — 0 TECHNETIUM MEDRONATE KIT: Performed by: INTERNAL MEDICINE

## 2018-01-25 PROCEDURE — A9503 TC99M MEDRONATE: HCPCS | Performed by: INTERNAL MEDICINE

## 2018-01-25 PROCEDURE — 78306 BONE IMAGING WHOLE BODY: CPT

## 2018-01-25 RX ORDER — TC 99M MEDRONATE 20 MG/10ML
26.7 INJECTION, POWDER, LYOPHILIZED, FOR SOLUTION INTRAVENOUS
Status: COMPLETED | OUTPATIENT
Start: 2018-01-25 | End: 2018-01-25

## 2018-01-25 RX ADMIN — Medication 26.7 MILLICURIE: at 08:59

## 2019-01-01 DIAGNOSIS — Z51.5 HOSPICE CARE: ICD-10-CM

## 2019-01-01 DIAGNOSIS — Z51.5 HOSPICE CARE: Primary | ICD-10-CM

## 2019-01-01 RX ORDER — MORPHINE SULFATE 60 MG/1
180 TABLET, FILM COATED, EXTENDED RELEASE ORAL EVERY 8 HOURS
Qty: 270 TABLET | Refills: 0 | Status: SHIPPED | OUTPATIENT
Start: 2019-01-01

## 2019-01-01 RX ORDER — MORPHINE SULFATE 20 MG/ML
5 SOLUTION ORAL
Qty: 30 ML | Refills: 0 | Status: SHIPPED | OUTPATIENT
Start: 2019-01-01 | End: 2019-01-01 | Stop reason: SDUPTHER

## 2019-01-01 RX ORDER — MORPHINE SULFATE 60 MG/1
180 TABLET, FILM COATED, EXTENDED RELEASE ORAL EVERY 8 HOURS
Qty: 270 TABLET | Refills: 0 | Status: SHIPPED | OUTPATIENT
Start: 2019-01-01 | End: 2019-01-01 | Stop reason: SDUPTHER

## 2019-01-01 RX ORDER — MORPHINE SULFATE 60 MG/1
120 TABLET, FILM COATED, EXTENDED RELEASE ORAL EVERY 8 HOURS
Qty: 180 TABLET | Refills: 0 | Status: SHIPPED | OUTPATIENT
Start: 2019-01-01 | End: 2019-01-01 | Stop reason: SDUPTHER

## 2019-01-01 RX ORDER — MORPHINE SULFATE 20 MG/ML
20 SOLUTION ORAL
Qty: 120 ML | Refills: 0 | Status: SHIPPED | OUTPATIENT
Start: 2019-01-01 | End: 2019-01-01 | Stop reason: SDUPTHER

## 2019-01-01 RX ORDER — MORPHINE SULFATE 60 MG/1
180 TABLET, FILM COATED, EXTENDED RELEASE ORAL EVERY 8 HOURS
Qty: 36 TABLET | Refills: 0 | Status: SHIPPED | OUTPATIENT
Start: 2019-01-01 | End: 2019-01-01 | Stop reason: SDUPTHER

## 2019-01-01 RX ORDER — MORPHINE SULFATE 60 MG/1
120 TABLET, FILM COATED, EXTENDED RELEASE ORAL EVERY 12 HOURS
Qty: 120 TABLET | Refills: 0 | Status: SHIPPED | OUTPATIENT
Start: 2019-01-01 | End: 2019-01-01 | Stop reason: SDUPTHER

## 2019-01-01 RX ORDER — MORPHINE SULFATE 20 MG/ML
10 SOLUTION ORAL
Qty: 30 ML | Refills: 0 | Status: SHIPPED | OUTPATIENT
Start: 2019-01-01 | End: 2019-01-01 | Stop reason: SDUPTHER

## 2019-01-01 RX ORDER — MORPHINE SULFATE 60 MG/1
TABLET, FILM COATED, EXTENDED RELEASE ORAL
Qty: 150 TABLET | Refills: 0 | Status: SHIPPED | OUTPATIENT
Start: 2019-01-01 | End: 2019-01-01 | Stop reason: SDUPTHER

## 2019-01-01 RX ORDER — HYDROCODONE BITARTRATE AND ACETAMINOPHEN 10; 325 MG/1; MG/1
2 TABLET ORAL EVERY 6 HOURS PRN
Qty: 240 TABLET | Refills: 0 | Status: SHIPPED | OUTPATIENT
Start: 2019-01-01

## 2019-01-01 RX ORDER — MORPHINE SULFATE 20 MG/ML
20 SOLUTION ORAL
Qty: 720 ML | Refills: 0 | Status: SHIPPED | OUTPATIENT
Start: 2019-01-01

## 2019-03-26 NOTE — TELEPHONE ENCOUNTER
[facility] patient, [facility][phone]. RX#[id number].

## 2019-04-25 NOTE — TELEPHONE ENCOUNTER
Patient is up to date on controlled medication consent, Christiano report, and appt from [phone].

## 2019-05-28 NOTE — TELEPHONE ENCOUNTER
[facility] [place] patient, fax to [phone]. RX#[id number].

## 2019-07-18 NOTE — TELEPHONE ENCOUNTER
[facility] patient, fax to [phone]. RX#[id number].

## (undated) DEVICE — CANN SMPL SOFTECH BIFLO ETCO2 A/M 7FT

## (undated) DEVICE — SINGLE-USE BIOPSY FORCEPS: Brand: RADIAL JAW 4